# Patient Record
Sex: FEMALE | Race: ASIAN | NOT HISPANIC OR LATINO | URBAN - METROPOLITAN AREA
[De-identification: names, ages, dates, MRNs, and addresses within clinical notes are randomized per-mention and may not be internally consistent; named-entity substitution may affect disease eponyms.]

---

## 2020-07-07 ENCOUNTER — COMMUNICATION - HEALTHEAST (OUTPATIENT)
Dept: CARDIOLOGY | Facility: CLINIC | Age: 55
End: 2020-07-07

## 2020-07-07 ENCOUNTER — OFFICE VISIT - HEALTHEAST (OUTPATIENT)
Dept: CARDIOLOGY | Facility: CLINIC | Age: 55
End: 2020-07-07

## 2020-07-07 DIAGNOSIS — R07.89 CHEST TIGHTNESS: ICD-10-CM

## 2020-07-07 DIAGNOSIS — R00.2 PALPITATIONS: ICD-10-CM

## 2020-07-07 LAB
ATRIAL RATE - MUSE: 87 BPM
DIASTOLIC BLOOD PRESSURE - MUSE: NORMAL
INTERPRETATION ECG - MUSE: NORMAL
P AXIS - MUSE: 60 DEGREES
PR INTERVAL - MUSE: 132 MS
QRS DURATION - MUSE: 78 MS
QT - MUSE: 354 MS
QTC - MUSE: 425 MS
R AXIS - MUSE: 91 DEGREES
SYSTOLIC BLOOD PRESSURE - MUSE: NORMAL
T AXIS - MUSE: 46 DEGREES
VENTRICULAR RATE- MUSE: 87 BPM

## 2020-07-07 ASSESSMENT — MIFFLIN-ST. JEOR: SCORE: 1135.2

## 2020-07-09 ENCOUNTER — HOSPITAL ENCOUNTER (OUTPATIENT)
Dept: CT IMAGING | Facility: CLINIC | Age: 55
Discharge: HOME | End: 2020-07-09
Attending: INTERNAL MEDICINE

## 2020-07-09 DIAGNOSIS — R07.89 CHEST TIGHTNESS: ICD-10-CM

## 2020-07-09 LAB
BSA FOR ECHO PROCEDURE: 1.62 M2
CREAT BLD-MCNC: 0.7 MG/DL (ref 0.6–1.1)
CV CALCIUM SCORE AGATSTON LM: 0
CV CALCIUM SCORING AGATSON LAD: 0
CV CALCIUM SCORING AGATSTON CX: 0
CV CALCIUM SCORING AGATSTON RCA: 0
CV CALCIUM SCORING AGATSTON TOTAL: 0
GFR SERPL CREATININE-BSD FRML MDRD: >60 ML/MIN/1.73M2
LEFT VENTRICLE HEART RATE: 75 BPM

## 2020-07-09 ASSESSMENT — MIFFLIN-ST. JEOR: SCORE: 1135.2

## 2020-07-16 ENCOUNTER — HOSPITAL ENCOUNTER (OUTPATIENT)
Dept: CARDIOLOGY | Facility: HOSPITAL | Age: 55
Discharge: HOME | End: 2020-07-16
Attending: INTERNAL MEDICINE

## 2020-07-16 DIAGNOSIS — R07.89 CHEST TIGHTNESS: ICD-10-CM

## 2020-07-16 DIAGNOSIS — R00.2 PALPITATIONS: ICD-10-CM

## 2020-07-16 ASSESSMENT — MIFFLIN-ST. JEOR: SCORE: 1135.2

## 2020-07-17 LAB
AORTIC ROOT: 2.6 CM
ASCENDING AORTA: 2.5 CM
BSA FOR ECHO PROCEDURE: 1.62 M2
CV BLOOD PRESSURE: ABNORMAL MMHG
CV ECHO HEIGHT: 61 IN
CV ECHO WEIGHT: 134 LBS
DOP CALC LVOT AREA: 2.27 CM2
DOP CALC LVOT DIAMETER: 1.7 CM
DOP CALC LVOT PEAK VEL: 85 CM/S
DOP CALC LVOT STROKE VOLUME: 43.3 CM3
DOP CALCLVOT PEAK VEL VTI: 19.1 CM
EJECTION FRACTION: 64 % (ref 55–75)
FRACTIONAL SHORTENING: 43.3 % (ref 28–44)
INTERVENTRICULAR SEPTUM IN END DIASTOLE: 1.13 CM (ref 0.6–0.9)
IVS/PW RATIO: 1.1
LA AREA 1: 14 CM2
LA AREA 2: 12 CM2
LEFT ATRIUM LENGTH: 4 CM
LEFT ATRIUM SIZE: 2.8 CM
LEFT ATRIUM VOLUME INDEX: 22 ML/M2
LEFT ATRIUM VOLUME: 35.7 ML
LEFT VENTRICLE CARDIAC INDEX: 2.1 L/MIN/M2
LEFT VENTRICLE CARDIAC OUTPUT: 3.4 L/MIN
LEFT VENTRICLE DIASTOLIC VOLUME INDEX: 32.9 CM3/M2 (ref 29–61)
LEFT VENTRICLE DIASTOLIC VOLUME: 53.3 CM3 (ref 46–106)
LEFT VENTRICLE HEART RATE: 79 BPM
LEFT VENTRICLE MASS INDEX: 67.4 G/M2
LEFT VENTRICLE SYSTOLIC VOLUME INDEX: 11.9 CM3/M2 (ref 8–24)
LEFT VENTRICLE SYSTOLIC VOLUME: 19.2 CM3 (ref 14–42)
LEFT VENTRICULAR INTERNAL DIMENSION IN DIASTOLE: 3.35 CM (ref 3.8–5.2)
LEFT VENTRICULAR INTERNAL DIMENSION IN SYSTOLE: 1.9 CM (ref 2.2–3.5)
LEFT VENTRICULAR MASS: 109.3 G
LEFT VENTRICULAR OUTFLOW TRACT MEAN GRADIENT: 2 MMHG
LEFT VENTRICULAR OUTFLOW TRACT MEAN VELOCITY: 57.3 CM/S
LEFT VENTRICULAR OUTFLOW TRACT PEAK GRADIENT: 3 MMHG
LEFT VENTRICULAR POSTERIOR WALL IN END DIASTOLE: 1.04 CM (ref 0.6–0.9)
LV STROKE VOLUME INDEX: 26.7 ML/M2
MITRAL VALVE DECELERATION SLOPE: 4410 MM/S2
MITRAL VALVE E/A RATIO: 1.1
MITRAL VALVE PRESSURE HALF-TIME: 61 MS
MV AVERAGE E/E' RATIO: 8.5 CM/S
MV DECELERATION TIME: 208 MS
MV E'TISSUE VEL-LAT: 12.4 CM/S
MV E'TISSUE VEL-MED: 9.28 CM/S
MV LATERAL E/E' RATIO: 7.4
MV MEDIAL E/E' RATIO: 9.9
MV PEAK A VELOCITY: 84.9 CM/S
MV PEAK E VELOCITY: 91.7 CM/S
MV VALVE AREA PRESSURE 1/2 METHOD: 3.6 CM2
NUC REST DIASTOLIC VOLUME INDEX: 2144 LBS
NUC REST SYSTOLIC VOLUME INDEX: 61 IN
RIGHT VENTRICULAR INTERNAL DIMENSION IN DYSTOLE: 2.21 CM
TRICUSPID VALVE ANULAR PLANE SYSTOLIC EXCURSION: 1.7 CM

## 2021-06-04 VITALS
WEIGHT: 134 LBS | HEIGHT: 61 IN | BODY MASS INDEX: 25.3 KG/M2 | SYSTOLIC BLOOD PRESSURE: 90 MMHG | HEART RATE: 92 BPM | DIASTOLIC BLOOD PRESSURE: 58 MMHG | RESPIRATION RATE: 16 BRPM

## 2021-06-04 VITALS — BODY MASS INDEX: 25.3 KG/M2 | HEIGHT: 61 IN | WEIGHT: 134 LBS

## 2021-06-04 VITALS — HEIGHT: 61 IN | BODY MASS INDEX: 25.3 KG/M2 | WEIGHT: 134 LBS

## 2021-06-09 NOTE — TELEPHONE ENCOUNTER
Wellness Screening Tool  Symptom Screening:  Do you have one of the following NEW symptoms:    Fever (subjective or >100.0)?  No    A new cough?  No    Shortness of breath?  No     Chills? No     New loss of taste or smell? No     Generalized body aches? No     New persistent headache? No     New sore throat? No     Nausea, vomiting, or diarrhea?  No    Within the past 3 weeks, have you been exposed to someone with a known positive illness below:    COVID-19 (known or suspected)?  No    Chicken pox?  No    Mealses?  No    Pertussis?  No    Patient notified of visitor policy- They may have one person accompany them to their appointment, but they will need to wear a mask and will be screened upon arrival for symptoms: Yes  Pt informed to wear a mask: Yes  Pt notified if they develop any symptoms listed above, prior to their appointment, they are to call the clinic directly at [phone] for further instructions.  Yes  Patient's appointment status: Patient will be seen in clinic as scheduled on 7/7/20.

## 2021-06-29 NOTE — PROGRESS NOTES
Progress Notes by Aron Atkins MD at 2020 12:50 PM     Author: Aron Atkins MD Service: -- Author Type: Physician    Filed: 2020  1:44 PM Encounter Date: 2020 Status: Signed    : Aron Atkins MD (Physician)           Click to link to [facility] HEART CARE NOTE    Thank you, Dr. Webster, for asking me to see Jesus Snell in consultation at [facility] to evaluate palpitations, chest tightness.      Assessment/Plan:   A 55 years old woman lost her  3 weeks ago.  Her   suddenly.  Since then, developed palpitations, chest tightness with mild discomfort and pain.  Her symptoms are slightly getting worse over last 3 weeks, no improvement.  Her ECG in clinic today showed normal sinus rhythm, no ischemic change, no conduction abnormality.  We discussed further evaluation.  Holter monitor is requested for evaluation of palpitations, to make sure no cardiac arrhythmia.  If the patient has no leg arrhythmia, consider beta-blocker for improving her symptoms.  Echocardiogram is requested for evaluation of heart function and structure to make sure she has no stress-induced cardiomyopathy.  Patient complains of intermittent chest tightness with discomfort and mild pain.  He could not do exercise because of her fatigue and weakness.  Coronary CT angiogram is requested for evaluation of chest pain/tightness.    We will follow-up on her exam reports and discussed the findings with the patient.    Thank you for the opportunity to be involved in the care of Jesus Snell. If you have any questions, please feel free to contact me.  I will see the patient again as needed.    Much or all of the text in this note was generated through the use of Dragon Dictate voice-to-text software. Errors in spelling or words which seem out of context are unintentional.   Sound alike errors, in particular, may have escaped editing.       History of Present Illness:   It is my pleasure to see  "Jesus Snell at the [facility] for evaluation of Consult. Jesus Snell is a 55 y.o. female without significant past medical history who presents to cardiology clinic today for complaining of her chest tightness comfort and cavitations.    The patient's   of suddenly at home 3 weeks ago.  She was shocked.  Since then, she developed intermittent palpitations, chest tightness with mild pain, lasted variable duration, no radiation.  With anxious, stress, symptoms were mildly worse.  She had occasional lightheadedness, no dizziness or syncope.  She had no orthopnea, PND or leg edema.  Her heart rate and blood pressure at normal range today.    Past Medical History:   No significant past medical history.    Past Surgical History:   Cervical surgery 5 years ago.    Family History:   Reviewed: No family history of CAD.    Social History:    reports that she has never smoked. She has never used smokeless tobacco.    Review of Systems:   General: WNL  Eyes: WNL  Ears/Nose/Throat: WNL  Lungs: WNL  Heart: Irregular Heartbeat  Stomach: WNL  Bladder: WNL  Muscle/Joints: WNL  Skin: WNL  Nervous System: WNL  Mental Health: WNL     Blood: WNL    Meds:     Current Outpatient Medications:   ?  cetirizine (ZYRTEC) 10 MG tablet, Take 10 mg by mouth daily as needed., Disp: , Rfl:   ?  cholecalciferol, vitamin D3, 125 mcg (5,000 unit) capsule, Take 10,000 Units by mouth daily., Disp: , Rfl:   ?  mv-mn/folic ac/calcium/vit K1 (WOMEN'S 50 PLUS MULTIVITAMIN ORAL), Take 1 tablet by mouth daily., Disp: , Rfl:   ?  Vit C/Zinc Citrate/Herbal #163 (ZINC WITH VITAMIN C ORAL), Take 1 tablet by mouth daily., Disp: , Rfl:      Allergies:   Patient has no known allergies.    Objective:      Physical Exam  134 lb (60.8 kg)  5' 1\" (1.549 m)  Body mass index is 25.32 kg/m .  BP 90/58 (Patient Site: Right Arm, Patient Position: Sitting, Cuff Size: Adult Regular)   Pulse 92   Resp 16   Ht 5' 1\" (1.549 m)   Wt 134 lb (60.8 kg)   BMI " 25.32 kg/m      General Appearance:   Awake, Alert, No acute distress.   HEENT:  Pupil equal, reactive to light. No scleral icterus; the mucous membranes were moist. No oral ulcers or thrush.    Neck: No cervical bruits. No JVD. No thyromegaly. No lymph node enlargement or tenderness.   Chest: The spine was straight. The chest was symmetric.   Lungs:   Respirations unlabored. Lungs are clear to auscultation. No crackles. No wheezing.   Cardiovascular:   RRR, normal first and second heart sounds with no murmurs. No rubs or gallops.    Abdomen:  Soft. No tenderness. Non-distended. Bowels sounds are present   Extremities: Equal posterior tibial pulses. No leg edema.   Skin: No rashes or ulcers. Warm, Dry.   Musculoskeletal: No tenderness. No deformity.   Neurologic: Mood and affect are appropriate. No focal deficits.         EKG:  Personally reivewed  Normal sinus rhythm  Normal ECG  No previous ECG for comparison